# Patient Record
Sex: MALE | URBAN - NONMETROPOLITAN AREA
[De-identification: names, ages, dates, MRNs, and addresses within clinical notes are randomized per-mention and may not be internally consistent; named-entity substitution may affect disease eponyms.]

---

## 2022-10-15 ENCOUNTER — DOCUMENTATION (OUTPATIENT)
Dept: FAMILY MEDICINE CLINIC | Facility: CLINIC | Age: 27
End: 2022-10-15

## 2022-10-15 RX ORDER — METHYLPREDNISOLONE 4 MG/1
TABLET ORAL
Qty: 21 TABLET | Refills: 0 | Status: SHIPPED | OUTPATIENT
Start: 2022-10-15

## 2023-06-12 RX ORDER — SERTRALINE HYDROCHLORIDE 100 MG/1
100 TABLET, FILM COATED ORAL DAILY
Qty: 90 TABLET | Refills: 1 | Status: SHIPPED | OUTPATIENT
Start: 2023-06-12

## 2023-07-31 ENCOUNTER — CLINICAL SUPPORT (OUTPATIENT)
Dept: FAMILY MEDICINE CLINIC | Facility: CLINIC | Age: 28
End: 2023-07-31
Payer: COMMERCIAL

## 2023-07-31 DIAGNOSIS — L23.7 POISON IVY DERMATITIS: Primary | ICD-10-CM

## 2023-07-31 DIAGNOSIS — L23.7 POISON IVY: Primary | ICD-10-CM

## 2023-07-31 PROCEDURE — 96372 THER/PROPH/DIAG INJ SC/IM: CPT | Performed by: FAMILY MEDICINE

## 2023-07-31 RX ORDER — METHYLPREDNISOLONE 4 MG/1
TABLET ORAL
Qty: 21 TABLET | Refills: 0 | Status: SHIPPED | OUTPATIENT
Start: 2023-07-31

## 2023-07-31 RX ORDER — TRIAMCINOLONE ACETONIDE 40 MG/ML
80 INJECTION, SUSPENSION INTRA-ARTICULAR; INTRAMUSCULAR ONCE
Status: COMPLETED | OUTPATIENT
Start: 2023-07-31 | End: 2023-07-31

## 2023-07-31 RX ADMIN — TRIAMCINOLONE ACETONIDE 80 MG: 40 INJECTION, SUSPENSION INTRA-ARTICULAR; INTRAMUSCULAR at 10:32

## 2024-03-15 ENCOUNTER — DOCUMENTATION (OUTPATIENT)
Dept: FAMILY MEDICINE CLINIC | Facility: CLINIC | Age: 29
End: 2024-03-15
Payer: COMMERCIAL

## 2024-03-15 RX ORDER — SERTRALINE HYDROCHLORIDE 100 MG/1
100 TABLET, FILM COATED ORAL DAILY
Qty: 90 TABLET | Refills: 1 | Status: SHIPPED | OUTPATIENT
Start: 2024-03-15

## 2024-03-18 RX ORDER — SERTRALINE HYDROCHLORIDE 100 MG/1
100 TABLET, FILM COATED ORAL DAILY
Qty: 90 TABLET | Refills: 1 | OUTPATIENT
Start: 2024-03-18

## 2024-04-09 ENCOUNTER — TELEPHONE (OUTPATIENT)
Dept: FAMILY MEDICINE CLINIC | Facility: CLINIC | Age: 29
End: 2024-04-09

## 2024-04-15 ENCOUNTER — OFFICE VISIT (OUTPATIENT)
Dept: FAMILY MEDICINE CLINIC | Facility: CLINIC | Age: 29
End: 2024-04-15
Payer: COMMERCIAL

## 2024-04-15 VITALS
BODY MASS INDEX: 33.57 KG/M2 | HEIGHT: 76 IN | WEIGHT: 275.7 LBS | SYSTOLIC BLOOD PRESSURE: 116 MMHG | OXYGEN SATURATION: 96 % | HEART RATE: 82 BPM | DIASTOLIC BLOOD PRESSURE: 84 MMHG

## 2024-04-15 DIAGNOSIS — Z12.5 PROSTATE CANCER SCREENING: ICD-10-CM

## 2024-04-15 DIAGNOSIS — Z00.00 ENCOUNTER FOR WELLNESS EXAMINATION IN ADULT: Primary | ICD-10-CM

## 2024-04-15 DIAGNOSIS — F41.1 GAD (GENERALIZED ANXIETY DISORDER): ICD-10-CM

## 2024-04-15 DIAGNOSIS — E29.1 HYPOGONADISM IN MALE: ICD-10-CM

## 2024-04-15 PROCEDURE — 36415 COLL VENOUS BLD VENIPUNCTURE: CPT | Performed by: FAMILY MEDICINE

## 2024-04-15 PROCEDURE — 99395 PREV VISIT EST AGE 18-39: CPT | Performed by: FAMILY MEDICINE

## 2024-04-15 NOTE — ASSESSMENT & PLAN NOTE
Psychological condition is  stable just .  Continue current treatment regimen.  Psychological condition  will be reassessed in 1 year.

## 2024-04-15 NOTE — PROGRESS NOTES
Patient Name: Gene Turner  : 1995   MRN: [id number]     Chief Complaint:    Chief Complaint   Patient presents with    Sexual Problem     Patient wants testosterone levels checked        History of Present Illness: Gene Turner is a 29 y.o. male who is here today for their annual health maintenance and physical.  Chronic medical conditions include hypogonadism and anxiety.  He is due for repeat labs today, up-to-date on health maintenance.    Review of Systems:   Review of Systems   Constitutional:  Negative for fatigue.   Eyes:  Negative for blurred vision.   Respiratory:  Negative for cough, chest tightness and shortness of breath.    Cardiovascular:  Negative for chest pain, palpitations and leg swelling.   Gastrointestinal:  Negative for abdominal pain, constipation, diarrhea, nausea and vomiting.   Neurological:  Negative for dizziness, tremors and headache.   Psychiatric/Behavioral:  Negative for depressed mood. The patient is not nervous/anxious.        Past Medical History, Social History, Family History and Care Team were all reviewed with patient and updated as appropriate.     Medications:     Current Outpatient Medications:     sertraline (Zoloft) 100 MG tablet, Take 1 tablet by mouth Daily., Disp: 90 tablet, Rfl: 1    Allergies:   No Known Allergies        PHQ-2 Total Score: 0   PHQ-9 Total Score: 0      Intimate partner violence: (Screen on initial visit, older adult with injury or evidence of neglect):  Violence can be a problem in many people's lives, so I now ask every patient about trauma or abuse they may have experienced in a relationship.  Stress/Safety - Do you feel safe in your relationship?  Afraid/Abused - Have you ever been in a relationship where you were threatened, hurt, or afraid?  Friend/Family - Are your friends aware you have been hurt?  Emergency Plan - Do you have a safe place to go and the resources you need in an emergency?    Osteoporosis:   Men: history of low  "trauma fracture, androgen deprivation therapy for prostate cancer, hypogonadism, primary hyperparathyroidism, intestinal disorders.       Physical Exam:  Vital Signs:   Vitals:    04/15/24 0759   BP: 116/84   BP Location: Left arm   Patient Position: Sitting   Cuff Size: Large Adult   Pulse: 82   SpO2: 96%   Weight: 125 kg (275 lb 11.2 oz)   Height: 193 cm (76\")     Body mass index is 33.56 kg/m².     Physical Exam  Vitals and nursing note reviewed.   Constitutional:       Appearance: Normal appearance. He is obese.   HENT:      Right Ear: Tympanic membrane and ear canal normal.      Left Ear: Tympanic membrane and ear canal normal.      Mouth/Throat:      Mouth: Mucous membranes are moist.      Pharynx: Oropharynx is clear.   Eyes:      Conjunctiva/sclera: Conjunctivae normal.      Pupils: Pupils are equal, round, and reactive to light.   Cardiovascular:      Rate and Rhythm: Normal rate and regular rhythm.      Heart sounds: Normal heart sounds.   Pulmonary:      Effort: Pulmonary effort is normal.      Breath sounds: Normal breath sounds.   Abdominal:      General: Bowel sounds are normal.      Palpations: Abdomen is soft.      Tenderness: There is no abdominal tenderness.   Musculoskeletal:      Cervical back: Neck supple.   Lymphadenopathy:      Cervical: No cervical adenopathy.   Skin:     General: Skin is warm.   Neurological:      General: No focal deficit present.      Mental Status: He is alert and oriented to person, place, and time.   Psychiatric:         Mood and Affect: Mood normal.         Behavior: Behavior normal.         Procedures      Assessment/Plan:     Follow Up:   Return in about 1 year (around 4/15/2025) for Annual physical.    Healthcare Maintenance:   Counseling provided on Discussed injury prevention, diet and exercise, safe sexual practices, and screening for common diseases. Encouraged use of sunscreen and seatbelts. Discussed timing of colon cancer cancer screening, prostate cancer " screening, and review of skin for lesions. Avoidance of tobacco encouraged. Limitation or avoidance of alcohol encouraged. Recommend yearly dental and eye exams. Also discussed monitoring of blood pressure and lipids.    Gene Turner voices understanding and acceptance of this advice and will call back with any further questions or concerns. AVS with preventive healthcare tips printed for patient.     Mayte Elizondo, DO  [facility]

## 2024-04-16 LAB
ALBUMIN SERPL-MCNC: 4.1 G/DL (ref 4.3–5.2)
ALBUMIN/GLOB SERPL: 1.3 {RATIO} (ref 1.2–2.2)
ALP SERPL-CCNC: 70 IU/L (ref 44–121)
ALT SERPL-CCNC: 42 IU/L (ref 0–44)
AST SERPL-CCNC: 39 IU/L (ref 0–40)
BASOPHILS # BLD AUTO: 0.1 X10E3/UL (ref 0–0.2)
BASOPHILS NFR BLD AUTO: 1 %
BILIRUB SERPL-MCNC: 0.2 MG/DL (ref 0–1.2)
BUN SERPL-MCNC: 16 MG/DL (ref 6–20)
BUN/CREAT SERPL: 12 (ref 9–20)
CALCIUM SERPL-MCNC: 9.6 MG/DL (ref 8.7–10.2)
CHLORIDE SERPL-SCNC: 102 MMOL/L (ref 96–106)
CHOLEST SERPL-MCNC: 252 MG/DL (ref 100–199)
CO2 SERPL-SCNC: 19 MMOL/L (ref 20–29)
CREAT SERPL-MCNC: 1.29 MG/DL (ref 0.76–1.27)
EGFRCR SERPLBLD CKD-EPI 2021: 76 ML/MIN/1.73
EOSINOPHIL # BLD AUTO: 0.1 X10E3/UL (ref 0–0.4)
EOSINOPHIL NFR BLD AUTO: 2 %
ERYTHROCYTE [DISTWIDTH] IN BLOOD BY AUTOMATED COUNT: 14.1 % (ref 11.6–15.4)
ESTRADIOL SERPL-MCNC: 6.5 PG/ML (ref 7.6–42.6)
GLOBULIN SER CALC-MCNC: 3.1 G/DL (ref 1.5–4.5)
GLUCOSE SERPL-MCNC: 63 MG/DL (ref 70–99)
HBA1C MFR BLD: 5.6 % (ref 4.8–5.6)
HCT VFR BLD AUTO: 46.4 % (ref 37.5–51)
HDLC SERPL-MCNC: 38 MG/DL
HGB BLD-MCNC: 14.9 G/DL (ref 13–17.7)
IMM GRANULOCYTES # BLD AUTO: 0 X10E3/UL (ref 0–0.1)
IMM GRANULOCYTES NFR BLD AUTO: 0 %
LDLC SERPL CALC-MCNC: 184 MG/DL (ref 0–99)
LYMPHOCYTES # BLD AUTO: 1.6 X10E3/UL (ref 0.7–3.1)
LYMPHOCYTES NFR BLD AUTO: 30 %
MCH RBC QN AUTO: 28 PG (ref 26.6–33)
MCHC RBC AUTO-ENTMCNC: 32.1 G/DL (ref 31.5–35.7)
MCV RBC AUTO: 87 FL (ref 79–97)
MONOCYTES # BLD AUTO: 0.4 X10E3/UL (ref 0.1–0.9)
MONOCYTES NFR BLD AUTO: 8 %
NEUTROPHILS # BLD AUTO: 3.2 X10E3/UL (ref 1.4–7)
NEUTROPHILS NFR BLD AUTO: 59 %
PLATELET # BLD AUTO: 386 X10E3/UL (ref 150–450)
POTASSIUM SERPL-SCNC: 5 MMOL/L (ref 3.5–5.2)
PROT SERPL-MCNC: 7.2 G/DL (ref 6–8.5)
PSA SERPL-MCNC: 1.2 NG/ML (ref 0–4)
RBC # BLD AUTO: 5.32 X10E6/UL (ref 4.14–5.8)
SODIUM SERPL-SCNC: 140 MMOL/L (ref 134–144)
TRIGL SERPL-MCNC: 160 MG/DL (ref 0–149)
TSH SERPL DL<=0.005 MIU/L-ACNC: 1.48 UIU/ML (ref 0.45–4.5)
VLDLC SERPL CALC-MCNC: 30 MG/DL (ref 5–40)
WBC # BLD AUTO: 5.4 X10E3/UL (ref 3.4–10.8)

## 2024-04-19 LAB
TESTOST FREE SERPL-MCNC: 9.7 PG/ML (ref 8.7–25.1)
TESTOST SERPL-MCNC: 276 NG/DL (ref 264–916)

## 2024-06-25 ENCOUNTER — OFFICE VISIT (OUTPATIENT)
Dept: FAMILY MEDICINE CLINIC | Facility: CLINIC | Age: 29
End: 2024-06-25
Payer: COMMERCIAL

## 2024-06-25 VITALS
HEART RATE: 88 BPM | BODY MASS INDEX: 32.15 KG/M2 | DIASTOLIC BLOOD PRESSURE: 78 MMHG | HEIGHT: 76 IN | OXYGEN SATURATION: 97 % | SYSTOLIC BLOOD PRESSURE: 128 MMHG | TEMPERATURE: 97.3 F | WEIGHT: 264 LBS

## 2024-06-25 DIAGNOSIS — R68.84 MANDIBULAR PAIN: Primary | ICD-10-CM

## 2024-06-25 DIAGNOSIS — M26.69 TMJ CREPITUS: ICD-10-CM

## 2024-06-25 PROCEDURE — 99213 OFFICE O/P EST LOW 20 MIN: CPT | Performed by: NURSE PRACTITIONER

## 2024-06-25 RX ORDER — DICLOFENAC SODIUM 75 MG/1
75 TABLET, DELAYED RELEASE ORAL 2 TIMES DAILY
Qty: 10 TABLET | Refills: 0 | Status: SHIPPED | OUTPATIENT
Start: 2024-06-25 | End: 2024-06-30

## 2024-06-25 RX ORDER — TIZANIDINE HYDROCHLORIDE 4 MG/1
4 CAPSULE, GELATIN COATED ORAL 3 TIMES DAILY PRN
Qty: 21 CAPSULE | Refills: 0 | Status: SHIPPED | OUTPATIENT
Start: 2024-06-25 | End: 2024-07-02

## 2024-06-25 NOTE — PROGRESS NOTES
Answers submitted by the patient for this visit:  Primary Reason for Visit (Submitted on 2024)  What is the primary reason for your visit?: Ear Pain  Ear Pain Questionnaire (Submitted on 2024)  Chief Complaint: Ear pain  Affected ear: right  Chronicity: new  Onset: in the past 7 days  Progression since onset: coming and going  Frequency: daily  Fever: no fever  Pain - numeric: 6/10  jaw pain: Yes  Treatments tried : acetaminophen, NSAIDs, analgesic ear drops  Improvement on treatment: mild      Office Note     Name: Gene Turner    : 1995     MRN: [id number]     Chief Complaint  Ear Fullness (Or pressure, it hurts)    Subjective     History of Present Illness:  Gene Turner is a 29 y.o. male who presents today for R ear pain that has been ongoing for about 1 week.. He states he had an episode of vomiting last Saturday night, and pain has been worse since then. No pain with eating/chewing food. No sinus headache/sneezing/coughing/runny nose. He does state his wife has told him that he grinds his teeth at night. He has an appt with his dentist on Thursday.     Review of Systems:   Review of Systems as per HPI.     Family History: History reviewed. No pertinent family history.    Social History:   Social History     Socioeconomic History    Marital status:    Tobacco Use    Smoking status: Never    Smokeless tobacco: Current     Types: Chew   Vaping Use    Vaping status: Never Used   Substance and Sexual Activity    Alcohol use: Yes     Comment: occas    Drug use: Never    Sexual activity: Yes     Partners: Female     Birth control/protection: Vasectomy       Past Medical History:   Past Medical History:   Diagnosis Date    Anxiety     Depression        Past Surgical History:   Past Surgical History:   Procedure Laterality Date    VASECTOMY         Immunizations:   Immunization History   Administered Date(s) Administered    COVID-19 (MODERNA) 1st,2nd,3rd Dose Monovalent 2020, 2021     "DTaP 1995, 1995, 1995, 07/13/1995, 05/21/1999    Hepatitis B Adult/Adolescent IM 1995, 1995, 05/01/1995    HiB 1995, 1995, 1995, 07/13/1995    IPV 1995, 1994, 07/13/1995, 05/21/1999    MMR 07/13/1995, 05/21/1999    Td (TDVAX) 06/28/2005    Tdap 07/31/2020        Medications:     Current Outpatient Medications:     sertraline (Zoloft) 100 MG tablet, Take 1 tablet by mouth Daily., Disp: 90 tablet, Rfl: 1    diclofenac (VOLTAREN) 75 MG EC tablet, Take 1 tablet by mouth 2 (Two) Times a Day for 5 days., Disp: 10 tablet, Rfl: 0    TiZANidine (Zanaflex) 4 MG capsule, Take 1 capsule by mouth 3 (Three) Times a Day As Needed for Muscle Spasms for up to 7 days., Disp: 21 capsule, Rfl: 0    Allergies:   No Known Allergies    Objective     Vital Signs  /78 (BP Location: Right arm, Patient Position: Sitting, Cuff Size: Large Adult)   Pulse 88   Temp 97.3 °F (36.3 °C) (Infrared)   Ht 193 cm (75.98\")   Wt 120 kg (264 lb)   SpO2 97%   BMI 32.15 kg/m²   Estimated body mass index is 32.15 kg/m² as calculated from the following:    Height as of this encounter: 193 cm (75.98\").    Weight as of this encounter: 120 kg (264 lb).          Physical Exam  Vitals and nursing note reviewed.   Constitutional:       General: He is not in acute distress.     Appearance: Normal appearance. He is normal weight. He is not ill-appearing.   HENT:      Head: Normocephalic and atraumatic.      Jaw: Pain on movement present.        Comments: Pain in R TMJ area with open/closing of mouth; mild crepitus noted upon palpation     Right Ear: Tympanic membrane, ear canal and external ear normal.      Left Ear: Tympanic membrane, ear canal and external ear normal.      Nose: No congestion or rhinorrhea.      Mouth/Throat:      Mouth: Mucous membranes are moist.      Pharynx: Oropharynx is clear.   Eyes:      Conjunctiva/sclera: Conjunctivae normal.      Pupils: Pupils are equal, round, and " reactive to light.   Cardiovascular:      Rate and Rhythm: Normal rate and regular rhythm.      Pulses: Normal pulses.      Heart sounds: Normal heart sounds.   Pulmonary:      Effort: Pulmonary effort is normal.      Breath sounds: Normal breath sounds.   Musculoskeletal:      Cervical back: Normal range of motion and neck supple.   Lymphadenopathy:      Cervical: No cervical adenopathy.   Neurological:      Mental Status: He is alert and oriented to person, place, and time.   Psychiatric:         Mood and Affect: Mood normal.         Thought Content: Thought content normal.          Procedures    Assessment and Plan     1. TMJ crepitus      2. Mandibular pain    - TiZANidine (Zanaflex) 4 MG capsule; Take 1 capsule by mouth 3 (Three) Times a Day As Needed for Muscle Spasms for up to 7 days.  Dispense: 21 capsule; Refill: 0  - diclofenac (VOLTAREN) 75 MG EC tablet; Take 1 tablet by mouth 2 (Two) Times a Day for 5 days.  Dispense: 10 tablet; Refill: 0   Reviewed use of zanaflex and diclofenac including side effects. Advised use of ice to affected area as needed. Keep appointment with dentist - advised patient most likely needs a bite guard to wear at night. Patient stated understanding and agreed with POC.     Follow Up  Return if symptoms worsen or fail to improve.    [facility]  [address] [place] [address] DR CARRIE JIMENES [id number] [phone]

## 2024-12-12 ENCOUNTER — TELEPHONE (OUTPATIENT)
Dept: FAMILY MEDICINE CLINIC | Facility: CLINIC | Age: 29
End: 2024-12-12
Payer: COMMERCIAL

## 2024-12-12 RX ORDER — SERTRALINE HYDROCHLORIDE 100 MG/1
100 TABLET, FILM COATED ORAL DAILY
Qty: 90 TABLET | Refills: 0 | Status: SHIPPED | OUTPATIENT
Start: 2024-12-12

## 2024-12-31 ENCOUNTER — TELEPHONE (OUTPATIENT)
Dept: FAMILY MEDICINE CLINIC | Facility: CLINIC | Age: 29
End: 2024-12-31
Payer: COMMERCIAL

## 2025-03-27 ENCOUNTER — OFFICE VISIT (OUTPATIENT)
Dept: FAMILY MEDICINE CLINIC | Facility: CLINIC | Age: 30
End: 2025-03-27
Payer: COMMERCIAL

## 2025-03-27 VITALS
OXYGEN SATURATION: 99 % | WEIGHT: 253 LBS | BODY MASS INDEX: 30.81 KG/M2 | HEIGHT: 76 IN | DIASTOLIC BLOOD PRESSURE: 90 MMHG | HEART RATE: 89 BPM | SYSTOLIC BLOOD PRESSURE: 160 MMHG

## 2025-03-27 DIAGNOSIS — R03.0 ELEVATED BP WITHOUT DIAGNOSIS OF HYPERTENSION: ICD-10-CM

## 2025-03-27 DIAGNOSIS — Z79.890 LONG-TERM CURRENT USE OF TESTOSTERONE REPLACEMENT THERAPY: ICD-10-CM

## 2025-03-27 DIAGNOSIS — E78.2 MIXED HYPERLIPIDEMIA: ICD-10-CM

## 2025-03-27 DIAGNOSIS — R73.9 HYPERGLYCEMIA: ICD-10-CM

## 2025-03-27 DIAGNOSIS — F41.1 GAD (GENERALIZED ANXIETY DISORDER): ICD-10-CM

## 2025-03-27 DIAGNOSIS — E29.1 HYPOGONADISM IN MALE: Primary | ICD-10-CM

## 2025-03-27 DIAGNOSIS — Z13.21 ENCOUNTER FOR VITAMIN DEFICIENCY SCREENING: ICD-10-CM

## 2025-03-27 RX ORDER — SERTRALINE HYDROCHLORIDE 100 MG/1
100 TABLET, FILM COATED ORAL DAILY
Qty: 90 TABLET | Refills: 1 | Status: SHIPPED | OUTPATIENT
Start: 2025-03-27

## 2025-03-27 RX ORDER — TESTOSTERONE CYPIONATE 1000 MG/10ML
100 INJECTION, SOLUTION INTRAMUSCULAR
COMMUNITY

## 2025-03-27 NOTE — PROGRESS NOTES
"    Office Note     Name: Gene Turner    : 1995     MRN: [id number]     Chief Complaint  new patient (Refill meds)    Subjective     History of Present Illness:  Gene Turner is a 30 y.o. male who presents today for establishing care. He is transferring from Dr. Elizondo's office. He last had an annual physical in 2024.     History of Present Illness  The patient presents for transferring care from Dr. Elizondo. Here today for evaluation of elevated blood pressure, testosterone management, and medication refill.    He reports an unusual elevation in his blood pressure, which is typically not this high. He has consumed a sugar-free energy drink today and does not monitor his blood pressure regularly. He is not experiencing any chest pain.    His last testosterone injection was administered on Monday of this week, with a usual schedule of injections on  and . However, he admits to occasionally forgetting the second dose, leading to a self-adjusted increased dosage during the next administration. He expresses concern about potential side effects of high testosterone levels, as he has been informed that his provider's testosterone level was significantly elevated during their last blood test.    He is currently on a regimen of Zoloft 100 mg and requires a refill of this medication. He reports he has been on this medication for a while, feels it is working well for him, and does not require any dosage adjustment at this time.      MEDICATIONS  Zoloft, Testosterone      Objective     Past Medical History:   Diagnosis Date    Anxiety     Depression      Past Surgical History:   Procedure Laterality Date    VASECTOMY       History reviewed. No pertinent family history.    Vital Signs  /90   Pulse 89   Ht 193 cm (75.98\")   Wt 115 kg (253 lb)   SpO2 99%   BMI 30.81 kg/m²   Estimated body mass index is 30.81 kg/m² as calculated from the following:    Height as of this encounter: " "193 cm (75.98\").    Weight as of this encounter: 115 kg (253 lb).    Physical Exam  Vitals reviewed.   Constitutional:       Appearance: Normal appearance.   Cardiovascular:      Rate and Rhythm: Normal rate and regular rhythm.      Heart sounds: Normal heart sounds.   Pulmonary:      Effort: Pulmonary effort is normal.      Breath sounds: Normal breath sounds.   Skin:     General: Skin is warm and dry.   Neurological:      General: No focal deficit present.      Mental Status: He is alert and oriented to person, place, and time.   Psychiatric:         Mood and Affect: Mood normal.         Behavior: Behavior normal.        Physical Exam  Lungs are clear.  Heart sounds are normal.    Vital Signs  Blood pressure is elevated.    Results         Assessment and Plan     Diagnoses and all orders for this visit:    1. Hypogonadism in male (Primary)  -     Testosterone (Free & Total), LC / MS    2. Long-term current use of testosterone replacement therapy  -     Testosterone (Free & Total), LC / MS    3. Elevated BP without diagnosis of hypertension  -     Comprehensive Metabolic Panel  -     CBC & Differential  -     TSH  -     T4, Free    4. COLE (generalized anxiety disorder)  -     sertraline (Zoloft) 100 MG tablet; Take 1 tablet by mouth Daily.  Dispense: 90 tablet; Refill: 1    5. Mixed hyperlipidemia  -     Lipid Panel    6. Hyperglycemia  -     Comprehensive Metabolic Panel  -     Hemoglobin A1c    7. Encounter for vitamin deficiency screening  -     Vitamin D,25-Hydroxy  -     Vitamin B12  -     Folate  -     Magnesium      Assessment & Plan  1. Elevated blood pressure.  His elevated blood pressure could be attributed to a combination of factors, including testosterone therapy and caffeine intake. He has been advised to monitor his blood pressure at home and to contact the office immediately and/or seek ER evaluation if he experiences any unusual symptoms or chest pain.    2. Testosterone management.  The frequency " or dosage of his testosterone injections may need to be adjusted based on the results of the lab tests. A comprehensive set of laboratory tests will be conducted today, including free and total testosterone levels. The results are expected to return in approximately one week.    3. Medication management.  A prescription for Zoloft 100 mg has been sent to [facility] pharmacy in [place].      Follow Up  Return in about 6 months (around 9/27/2025) for Annual physical.      Patient or patient representative verbalized consent for the use of Ambient Listening during the visit with  COLE Fabian for chart documentation. 3/30/2025  12:04 EDT    COLE Fabian

## 2025-04-03 LAB
25(OH)D3+25(OH)D2 SERPL-MCNC: 19.8 NG/ML (ref 30–100)
ALBUMIN SERPL-MCNC: 4.6 G/DL (ref 4.1–5.1)
ALP SERPL-CCNC: 73 IU/L (ref 44–121)
ALT SERPL-CCNC: 29 IU/L (ref 0–44)
AST SERPL-CCNC: 27 IU/L (ref 0–40)
BASOPHILS # BLD AUTO: 0 X10E3/UL (ref 0–0.2)
BASOPHILS NFR BLD AUTO: 1 %
BILIRUB SERPL-MCNC: 0.4 MG/DL (ref 0–1.2)
BUN SERPL-MCNC: 11 MG/DL (ref 6–20)
BUN/CREAT SERPL: 10 (ref 9–20)
CALCIUM SERPL-MCNC: 9.6 MG/DL (ref 8.7–10.2)
CHLORIDE SERPL-SCNC: 102 MMOL/L (ref 96–106)
CHOLEST SERPL-MCNC: 242 MG/DL (ref 100–199)
CO2 SERPL-SCNC: 23 MMOL/L (ref 20–29)
CREAT SERPL-MCNC: 1.13 MG/DL (ref 0.76–1.27)
EGFRCR SERPLBLD CKD-EPI 2021: 89 ML/MIN/1.73
EOSINOPHIL # BLD AUTO: 0.1 X10E3/UL (ref 0–0.4)
EOSINOPHIL NFR BLD AUTO: 2 %
ERYTHROCYTE [DISTWIDTH] IN BLOOD BY AUTOMATED COUNT: 13.4 % (ref 11.6–15.4)
FOLATE SERPL-MCNC: 5.4 NG/ML
GLOBULIN SER CALC-MCNC: 3 G/DL (ref 1.5–4.5)
GLUCOSE SERPL-MCNC: 74 MG/DL (ref 70–99)
HBA1C MFR BLD: 5.5 % (ref 4.8–5.6)
HCT VFR BLD AUTO: 49.1 % (ref 37.5–51)
HDLC SERPL-MCNC: 50 MG/DL
HGB BLD-MCNC: 16.4 G/DL (ref 13–17.7)
IMM GRANULOCYTES # BLD AUTO: 0 X10E3/UL (ref 0–0.1)
IMM GRANULOCYTES NFR BLD AUTO: 0 %
LDLC SERPL CALC-MCNC: 180 MG/DL (ref 0–99)
LYMPHOCYTES # BLD AUTO: 1.6 X10E3/UL (ref 0.7–3.1)
LYMPHOCYTES NFR BLD AUTO: 25 %
MAGNESIUM SERPL-MCNC: 2.2 MG/DL (ref 1.6–2.3)
MCH RBC QN AUTO: 30.1 PG (ref 26.6–33)
MCHC RBC AUTO-ENTMCNC: 33.4 G/DL (ref 31.5–35.7)
MCV RBC AUTO: 90 FL (ref 79–97)
MONOCYTES # BLD AUTO: 0.4 X10E3/UL (ref 0.1–0.9)
MONOCYTES NFR BLD AUTO: 6 %
NEUTROPHILS # BLD AUTO: 4.1 X10E3/UL (ref 1.4–7)
NEUTROPHILS NFR BLD AUTO: 66 %
PLATELET # BLD AUTO: 262 X10E3/UL (ref 150–450)
POTASSIUM SERPL-SCNC: 5.1 MMOL/L (ref 3.5–5.2)
PROT SERPL-MCNC: 7.6 G/DL (ref 6–8.5)
RBC # BLD AUTO: 5.45 X10E6/UL (ref 4.14–5.8)
SODIUM SERPL-SCNC: 138 MMOL/L (ref 134–144)
T4 FREE SERPL-MCNC: 1.26 NG/DL (ref 0.82–1.77)
TESTOST FREE SERPL-MCNC: 17.2 PG/ML (ref 8.7–25.1)
TESTOST SERPL-MCNC: 546.1 NG/DL (ref 264–916)
TRIGL SERPL-MCNC: 70 MG/DL (ref 0–149)
TSH SERPL DL<=0.005 MIU/L-ACNC: 1.14 UIU/ML (ref 0.45–4.5)
VIT B12 SERPL-MCNC: 810 PG/ML (ref 232–1245)
VLDLC SERPL CALC-MCNC: 12 MG/DL (ref 5–40)
WBC # BLD AUTO: 6.2 X10E3/UL (ref 3.4–10.8)

## 2025-04-05 ENCOUNTER — DOCUMENTATION (OUTPATIENT)
Dept: FAMILY MEDICINE CLINIC | Facility: CLINIC | Age: 30
End: 2025-04-05
Payer: COMMERCIAL

## 2025-04-05 DIAGNOSIS — E78.00 ELEVATED LDL CHOLESTEROL LEVEL: Primary | ICD-10-CM

## 2025-04-05 RX ORDER — ROSUVASTATIN CALCIUM 5 MG/1
5 TABLET, COATED ORAL DAILY
Qty: 90 TABLET | Refills: 1 | Status: SHIPPED | OUTPATIENT
Start: 2025-04-05

## 2025-04-05 NOTE — PROGRESS NOTES
Spoke to patient regarding recent lab results, he would like to initiate cholesterol medication. Crestor 5mg sent to pharmacy. Will recheck labs in 3-6 months to monitor for effectiveness. Informed of possible side effects, he will alert office if he develops any side effects.

## 2025-06-30 DIAGNOSIS — H60.333 ACUTE SWIMMER'S EAR OF BOTH SIDES: Primary | ICD-10-CM

## 2025-06-30 RX ORDER — OFLOXACIN 3 MG/ML
5 SOLUTION AURICULAR (OTIC) DAILY
Qty: 5 ML | Refills: 0 | Status: SHIPPED | OUTPATIENT
Start: 2025-06-30

## 2025-08-23 RX ORDER — PREDNISONE 20 MG/1
40 TABLET ORAL DAILY
Qty: 10 TABLET | Refills: 0 | Status: SHIPPED | OUTPATIENT
Start: 2025-08-23